# Patient Record
Sex: FEMALE | Race: WHITE
[De-identification: names, ages, dates, MRNs, and addresses within clinical notes are randomized per-mention and may not be internally consistent; named-entity substitution may affect disease eponyms.]

---

## 2017-06-24 ENCOUNTER — HEALTH MAINTENANCE LETTER (OUTPATIENT)
Age: 32
End: 2017-06-24

## 2017-08-13 ENCOUNTER — HOSPITAL ENCOUNTER (EMERGENCY)
Facility: CLINIC | Age: 32
Discharge: HOME | End: 2017-08-13
Attending: EMERGENCY MEDICINE | Admitting: EMERGENCY MEDICINE
Payer: COMMERCIAL

## 2017-08-13 VITALS
TEMPERATURE: 98.3 F | HEIGHT: 69 IN | WEIGHT: 160 LBS | OXYGEN SATURATION: 95 % | BODY MASS INDEX: 23.7 KG/M2 | DIASTOLIC BLOOD PRESSURE: 74 MMHG | SYSTOLIC BLOOD PRESSURE: 105 MMHG | RESPIRATION RATE: 16 BRPM

## 2017-08-13 DIAGNOSIS — T78.40XA ALLERGIC STATE, INITIAL ENCOUNTER: ICD-10-CM

## 2017-08-13 PROCEDURE — 99283 EMERGENCY DEPT VISIT LOW MDM: CPT

## 2017-08-13 PROCEDURE — 25000125 ZZHC RX 250: Performed by: EMERGENCY MEDICINE

## 2017-08-13 RX ORDER — PREDNISONE 20 MG/1
TABLET ORAL
Qty: 10 TABLET | Refills: 0 | Status: ON HOLD | OUTPATIENT
Start: 2017-08-13 | End: 2019-08-16

## 2017-08-13 RX ORDER — PREDNISONE 20 MG/1
60 TABLET ORAL ONCE
Status: COMPLETED | OUTPATIENT
Start: 2017-08-13 | End: 2017-08-13

## 2017-08-13 RX ADMIN — PREDNISONE 60 MG: 20 TABLET ORAL at 21:23

## 2017-08-13 ASSESSMENT — VISUAL ACUITY
OD: 20/30
OS: 20/25

## 2017-08-13 NOTE — ED AVS SNAPSHOT
Emergency Department    [address] [facility] [address]    Phone:  [phone]    Fax:  [phone]                                       Sheryl Barton   MRN: [id number]    Department:   Emergency Department   Date of Visit:  8/13/2017           Patient Information     Date Of Birth          1985        Your diagnoses for this visit were:     Possible allergic state, initial encounter        You were seen by Oscar Mejia MD.      Follow-up Information     Schedule an appointment as soon as possible for a visit to follow up.    Why:  with your physician        Follow up with  Emergency Department.    Specialty:  EMERGENCY MEDICINE    Why:  If symptoms worsen    Contact information:    [address] [facility] [address] [phone]        Discharge Instructions         Controlling Allergens: In the Home  Even a clean home can be full of allergens, so take a moment to see what you can do to cut down on allergens in each room of your home. Try to avoid things like cigarette smoke and perfume. They can irritate your eyes, nose, throat, and lungs and make your allergies worse.    Buy an air purifier with a HEPA filter. Look in consumer magazines for recommendations. Avoid vaporizers and humidifiers, since they encourage mold and dust-mite growth.    Use shades or vertical blinds instead of horizontal blinds, which collect dust. Replace drapes with curtains that can be washed regularly.    Enclose mattresses, box springs, and pillows in allergy-proof casings. Use washable blankets and quilts. Avoid feather pillows, down comforters, and wool blankets.    Avoid dust-catching clutter. Have enclosed places to keep books, toys, and clothes. Keep closet doors closed.    Use washable throw rugs wherever possible, or have bare floors.    Put filters over forced-air heating vents. Change the filters regularly.    Keep your car clean. Vacuum the seats and carpets regularly.  If you have air conditioning, use it instead of opening the windows.    Keep rain gutters clean. Remove leaves and debris that can grow mold.    Check stored food for spoilage and mold growth. Clean up spills right away.    Don't let wet clothing sit and grow mold. And don't hang clothes outside to dry where they can collect airborne pollen. Dry clothing immediately in a clothes dryer that's vented to the outside.    Install a fan to keep the bathroom well ventilated.        Avoid yard work and pulling weeds. These and other outdoor activities increase your exposure to pollen. If that s not possible, wear a filter mask. When you re done, bathe, wash your hair, and change your clothes.      Date Last Reviewed: 9/1/2016 2000-2017 [employer]. [address] [place] [address], [place] [address]. All rights reserved. This information is not intended as a substitute for professional medical care. Always follow your healthcare professional's instructions.          24 Hour Appointment Hotline       To make an appointment at any [facility], call [phone] ([phone]). If you don't have a family doctor or clinic, we will help you find one. [place] clinics are conveniently located to serve the needs of you and your family.             Review of your medicines      START taking        Dose / Directions Last dose taken    predniSONE 20 MG tablet   Commonly known as:  DELTASONE   Quantity:  10 tablet        Take two tablets (= 40mg) each day for 5 (five) days   Refills:  0          Our records show that you are taking the medicines listed below. If these are incorrect, please call your family doctor or clinic.        Dose / Directions Last dose taken    cholecalciferol 29695 UNITS capsule   Commonly known as:  VITAMIN D3   Dose:  1 capsule   Quantity:  8 capsule        Take 1 capsule by mouth once a week.   Refills:  0        fish oil-omega-3 fatty acids 1000 MG capsule   Dose:  1 g   Quantity:  90 capsule         Take 1 capsule by mouth three times a week.   Refills:  3        prenatal multivitamin  with iron 28-0.8 MG Tabs   Dose:  1 tablet   Quantity:  90 tablet        Take 1 tablet by mouth daily.   Refills:  3        VITAMIN C PO        None Entered   Refills:  0                Prescriptions were sent or printed at these locations (1 Prescription)                   Other Prescriptions                Printed at Department/Unit printer (1 of 1)         predniSONE (DELTASONE) 20 MG tablet                Orders Needing Specimen Collection     None      Pending Results     No orders found from 8/11/2017 to 8/14/2017.            Pending Culture Results     No orders found from 8/11/2017 to 8/14/2017.            Pending Results Instructions     If you had any lab results that were not finalized at the time of your Discharge, you can call the ED Lab Result RN at [phone]. You will be contacted by this team for any positive Lab results or changes in treatment. The nurses are available 7 days a week from 10A to 6:30P.  You can leave a message 24 hours per day and they will return your call.        Test Results From Your Hospital Stay               Clinical Quality Measure: Blood Pressure Screening     Your blood pressure was checked while you were in the emergency department today. The last reading we obtained was  BP: 126/89 . Please read the guidelines below about what these numbers mean and what you should do about them.  If your systolic blood pressure (the top number) is less than 120 and your diastolic blood pressure (the bottom number) is less than 80, then your blood pressure is normal. There is nothing more that you need to do about it.  If your systolic blood pressure (the top number) is 120-139 or your diastolic blood pressure (the bottom number) is 80-89, your blood pressure may be higher than it should be. You should have your blood pressure rechecked within a year by a primary care provider.  If your  "systolic blood pressure (the top number) is 140 or greater or your diastolic blood pressure (the bottom number) is 90 or greater, you may have high blood pressure. High blood pressure is treatable, but if left untreated over time it can put you at risk for heart attack, stroke, or kidney failure. You should have your blood pressure rechecked by a primary care provider within the next 4 weeks.  If your provider in the emergency department today gave you specific instructions to follow-up with your doctor or provider even sooner than that, you should follow that instruction and not wait for up to 4 weeks for your follow-up visit.        Thank you for choosing [facility]       Thank you for choosing [facility] for your care. Our goal is always to provide you with excellent care. Hearing back from our patients is one way we can continue to improve our services. Please take a few minutes to complete the written survey that you may receive in the mail after you visit with us. Thank you!        [employer]     [employer] lets you send messages to your doctor, view your test results, renew your prescriptions, schedule appointments and more. To sign up, go to [email or web address] . Click on \"Log in\" on the left side of the screen, which will take you to the Welcome page. Then click on \"Sign up Now\" on the right side of the page.     You will be asked to enter the access code listed below, as well as some personal information. Please follow the directions to create your username and password.     Your access code is: [email or web address]  Expires: 2017  9:19 PM     Your access code will  in 90 days. If you need help or a new code, please call your [facility] clinic or [phone].        Care EveryWhere ID     This is your Care EveryWhere ID. This could be used by other organizations to access your [facility] medical records  [id number]        Equal Access to Services     MALENA MILLER: Miracle Edwards, " catrachita russell, juan ramon leimavalerie ash, kayla sanchez ah. So [facility] [phone].    ATENCIÓN: Si habla español, tiene a carpenter disposición servicios gratuitos de asistencia lingüística. Llame al [phone].    We comply with applicable federal civil rights laws and [place] laws. We do not discriminate on the basis of race, color, national origin, age, disability sex, sexual orientation or gender identity.            After Visit Summary       This is your record. Keep this with you and show to your community pharmacist(s) and doctor(s) at your next visit.

## 2017-08-13 NOTE — ED AVS SNAPSHOT
Emergency Department    [address] [place] [address]    Phone:  [phone]    Fax:  [phone]                                       Sheryl Barton   MRN: [id number]    Department:   Emergency Department   Date of Visit:  8/13/2017           After Visit Summary Signature Page     I have received my discharge instructions, and my questions have been answered. I have discussed any challenges I see with this plan with the nurse or doctor.    ..........................................................................................................................................  Patient/Patient Representative Signature      ..........................................................................................................................................  Patient Representative Print Name and Relationship to Patient    ..................................................               ................................................  Date                                            Time    ..........................................................................................................................................  Reviewed by Signature/Title    ...................................................              ..............................................  Date                                                            Time

## 2017-08-14 NOTE — DISCHARGE INSTRUCTIONS
Controlling Allergens: In the Home  Even a clean home can be full of allergens, so take a moment to see what you can do to cut down on allergens in each room of your home. Try to avoid things like cigarette smoke and perfume. They can irritate your eyes, nose, throat, and lungs and make your allergies worse.    Buy an air purifier with a HEPA filter. Look in consumer magazines for recommendations. Avoid vaporizers and humidifiers, since they encourage mold and dust-mite growth.    Use shades or vertical blinds instead of horizontal blinds, which collect dust. Replace drapes with curtains that can be washed regularly.    Enclose mattresses, box springs, and pillows in allergy-proof casings. Use washable blankets and quilts. Avoid feather pillows, down comforters, and wool blankets.    Avoid dust-catching clutter. Have enclosed places to keep books, toys, and clothes. Keep closet doors closed.    Use washable throw rugs wherever possible, or have bare floors.    Put filters over forced-air heating vents. Change the filters regularly.    Keep your car clean. Vacuum the seats and carpets regularly. If you have air conditioning, use it instead of opening the windows.    Keep rain gutters clean. Remove leaves and debris that can grow mold.    Check stored food for spoilage and mold growth. Clean up spills right away.    Don't let wet clothing sit and grow mold. And don't hang clothes outside to dry where they can collect airborne pollen. Dry clothing immediately in a clothes dryer that's vented to the outside.    Install a fan to keep the bathroom well ventilated.        Avoid yard work and pulling weeds. These and other outdoor activities increase your exposure to pollen. If that s not possible, wear a filter mask. When you re done, bathe, wash your hair, and change your clothes.      Date Last Reviewed: 9/1/2016 2000-2017 [employer]. [address] [place] [address], [place] [address]. All rights reserved.  This information is not intended as a substitute for professional medical care. Always follow your healthcare professional's instructions.

## 2017-08-14 NOTE — ED NOTES
"Patient 20/25 with left eye and 20/30 with right eye.  Patient had difficulty seeing with both eyes, feels that it is difficult to keep her eyes open. Patient feels tired, head hurts, and feels \"groggy, like i'm not all there\".  Patient denies taking any new medications. Patient has new cat, has been getting scratches to legs that patient is concerned about. Patient states her abdomen feels \"swollen\".    "

## 2017-08-14 NOTE — ED PROVIDER NOTES
CHIEF COMPLAINT:  Vision problem.      HISTORY OF PRESENT ILLNESS:  Sheryl Barton is a 32-year-old female with no significant medical history who came in with multiple complaints.  Her main complaint is that she has some difficulty with her eyes.  She indicates that her eyes feel a little heavy, and that she has some discomfort when she blinks.  She also indicates that she has some difficulty seeing with both of her eyes at times, but only transiently.  She also feels tired and groggy, and has an upset stomach, as well as at times a headache.  She denies any nasal congestion, cough, fevers, or drainage from the eyes. She indicates that she recently obtained a new cat about a month ago, and previously she was known to be allergic to cats.  She also indicates that she has had some scratches to both of her legs, and is concerned that she might be sustaining an infection from those.  She has been taking Claritin and Benadryl periodically without much relief.  She denies any nausea, vomiting or diarrhea, or any urinary symptoms.      PAST MEDICAL HISTORY:   1.  Anxiety.   2.  ADD.   3.  Allergic seasonal rhinitis.   4.  Low back pain.   5.  Bacterial vaginosis.      MEDICATIONS:   1.  Vitamins.   2.  Claritin.   3.  Benadryl as needed.   4.  I believe some ADD medication.      ALLERGIES:  No known drug allergies.      SOCIAL HISTORY:  Denies smoking.  Does drink alcohol.  She has one male partner.      REVIEW OF SYSTEMS:  See HPI.  All others are negative.      PHYSICAL EXAMINATION:   VITAL SIGNS:  Afebrile at 98.3, blood pressure 126/89, heart rate 103, respiratory rate 16, satting 100% on room air.   GENERAL:  This is a pleasant young female who is awake, alert, nontoxic.   EYES:  Normal, including her conjunctivae, and no drainage from the eyes.  Visual acuity 20/25 on the left eye, and 20/30 on the right.   OROPHARYNX:  Normal.   CARDIAC:  Regular rate and rhythm.  No murmurs.   RESPIRATORY:  Clear to auscultation.    ABDOMEN:  Positive bowel sounds.  Soft and nontender.  No CVA tenderness to palpation.   MUSCULOSKELETAL:  Extremities are atraumatic x4.   NEUROLOGIC:  Awake, alert and oriented x3.  Cranial nerves II-XII are intact.  Motor and sensation normal.   PSYCHIATRIC:  Slightly anxious.   SKIN:  She has a few scratches to her legs, none of which appear infected.      MEDICAL DECISION MAKING:  This is a 32-year-old female who came in with multiple complaints.  She has essentially a normal exam, though.  I suspect there may be an anxiety component to this, and possibly she could have an allergic component as well, given her history of an allergy to cats.  I felt it was reasonable to give her a trial of prednisone, and she was given her first oral dose here.  She seems to think that it is worth trying that as well.  At this point, though, I do not feel the need to do any further testing here, or other interventions.      I will send her home with 5 more days of prednisone as well to start tomorrow.  I instructed her to return with any concerns or worsening symptoms, and to follow up with her clinic as soon as possible.  I discussed whether or not we should obtain a pregnancy test, and my recommendation was to obtain one.  However, the patient declined that, stating she does not believe she is pregnant.  Regardless, my recommendation was to test her for pregnancy.  At this point, though, I do not feel the need to do any further testing, and she was discharged home.      IMPRESSION:  Possible allergic state.         LINDA BARBOSA MD             D: 2017 22:09   T: 2017 23:20   MT: [id number]      Name:     MIKEY MCMILLAN   MRN:      [id number]        Account:      [id number]   :      1985           Visit Date:   2017      Document: [id number]       cc: [facility]

## 2019-08-16 ENCOUNTER — ANESTHESIA (OUTPATIENT)
Dept: SURGERY | Facility: CLINIC | Age: 34
End: 2019-08-16

## 2019-08-16 ENCOUNTER — HOSPITAL ENCOUNTER (OUTPATIENT)
Facility: CLINIC | Age: 34
Discharge: HOME | End: 2019-08-16
Attending: PLASTIC SURGERY | Admitting: PLASTIC SURGERY
Payer: COMMERCIAL

## 2019-08-16 ENCOUNTER — ANESTHESIA EVENT (OUTPATIENT)
Dept: SURGERY | Facility: CLINIC | Age: 34
End: 2019-08-16

## 2019-08-16 VITALS
TEMPERATURE: 98 F | HEART RATE: 73 BPM | HEIGHT: 69 IN | SYSTOLIC BLOOD PRESSURE: 116 MMHG | OXYGEN SATURATION: 99 % | DIASTOLIC BLOOD PRESSURE: 83 MMHG | BODY MASS INDEX: 25.53 KG/M2 | WEIGHT: 172.4 LBS | RESPIRATION RATE: 16 BRPM

## 2019-08-16 PROCEDURE — 36000093 ZZH SURGERY LEVEL 4 1ST 30 MIN: Performed by: PLASTIC SURGERY

## 2019-08-16 PROCEDURE — 40000170 ZZH STATISTIC PRE-PROCEDURE ASSESSMENT II: Performed by: PLASTIC SURGERY

## 2019-08-16 PROCEDURE — 88305 TISSUE EXAM BY PATHOLOGIST: CPT | Performed by: PLASTIC SURGERY

## 2019-08-16 PROCEDURE — 88305 TISSUE EXAM BY PATHOLOGIST: CPT | Mod: 26 | Performed by: PLASTIC SURGERY

## 2019-08-16 PROCEDURE — 71000012 ZZH RECOVERY PHASE 1 LEVEL 1 FIRST HR: Performed by: PLASTIC SURGERY

## 2019-08-16 PROCEDURE — 27210794 ZZH OR GENERAL SUPPLY STERILE: Performed by: PLASTIC SURGERY

## 2019-08-16 PROCEDURE — 36000063 ZZH SURGERY LEVEL 4 EA 15 ADDTL MIN: Performed by: PLASTIC SURGERY

## 2019-08-16 PROCEDURE — 71000027 ZZH RECOVERY PHASE 2 EACH 15 MINS: Performed by: PLASTIC SURGERY

## 2019-08-16 PROCEDURE — 25000132 ZZH RX MED GY IP 250 OP 250 PS 637: Performed by: ANESTHESIOLOGY

## 2019-08-16 RX ORDER — ACETAMINOPHEN 500 MG
TABLET ORAL EVERY 8 HOURS PRN
COMMUNITY

## 2019-08-16 RX ORDER — ACETAMINOPHEN 325 MG/1
650 TABLET ORAL
Status: DISCONTINUED | OUTPATIENT
Start: 2019-08-16 | End: 2019-08-16 | Stop reason: HOSPADM

## 2019-08-16 RX ORDER — ACETAMINOPHEN 500 MG
1000 TABLET ORAL ONCE
Status: COMPLETED | OUTPATIENT
Start: 2019-08-16 | End: 2019-08-16

## 2019-08-16 RX ADMIN — ACETAMINOPHEN 1000 MG: 500 TABLET, FILM COATED ORAL at 09:33

## 2019-08-16 ASSESSMENT — MIFFLIN-ST. JEOR: SCORE: 1541.38

## 2019-08-16 NOTE — OP NOTE
"Excision Skin Lesion Procedure Note  August 16, 2019 12:33 PM    Preoperative diagnosis: Organoid nevus, neck  Postoperative diagnosis: Organoid nevus, neck    Indications: Relieve symptoms    Procedure:   1. Excision organoid nevus, neck 8 x 2 cm  2. Intermediate closure, 8 cm    The patient was identified and marked in the preoperative holding area. After informed consent, including risks of procedure, infection and/or bleeding, patient was prepped in the usual sterile manner. Local anesthesia was infiltrated subcutaneously with 10 mL of 1% lidocaine with epinephrine. Following this, a full-thickness elliptical incision was made on the skin with a 15 blade. The organoid nevus was then excised. The defect measured 8 x 2 cm. The specimen was marked with a stitch at 12 o'clock. Careful hemostatis was obtained. Following this, the wound was closed in layers with deep dermal 4-0 Monocryl, followed by a subcuticular 5-0 Monocryl. Total incision length is 8 cm. Wound was dressed with 1/4\" Steri-Strips and Exofin.  Patient tolerated the procedure without complications. She was discharged with instructions.     Zainab Gold MD  "

## 2019-08-16 NOTE — OR NURSING
Surgeon requested to place local anesthetic in pre-op.  Patient placed on cart in room 16.   VSS.  10cc  of Lidocaine with Epi placed by Dr. Gold.    Awaiting transfer to OR.

## 2019-08-19 LAB — COPATH REPORT: NORMAL

## (undated) DEVICE — DRSG STERI STRIP 1/2X4" R1547

## (undated) DEVICE — BLADE KNIFE SURG 15 371115

## (undated) DEVICE — LINEN GOWN OVERSIZE 5408

## (undated) DEVICE — DRSG TELFA ISLAND 4X8" 7541

## (undated) DEVICE — SU ETHILON 3-0 FS-1 18" 669H

## (undated) DEVICE — PREP CHLORAPREP 26ML TINTED ORANGE  260815

## (undated) DEVICE — LINEN TOWEL PACK X5 5464

## (undated) DEVICE — ADH SKIN CLOSURE PREMIERPRO EXOFIN 1.0ML 3470

## (undated) DEVICE — SOL WATER IRRIG 1000ML BOTTLE 2F7114

## (undated) DEVICE — SOL NACL 0.9% IRRIG 1000ML BOTTLE 2F7124

## (undated) DEVICE — SU MONOCRYL 5-0 P-3 18" UND Y493G

## (undated) DEVICE — PACK MINOR SBA15MIFSE

## (undated) DEVICE — SU PROLENE 3-0 PS-2 18" 8687H

## (undated) RX ORDER — ACETAMINOPHEN 325 MG/1
TABLET ORAL
Status: DISPENSED
Start: 2019-08-16

## (undated) RX ORDER — ACETAMINOPHEN 500 MG
TABLET ORAL
Status: DISPENSED
Start: 2019-08-16